# Patient Record
Sex: MALE | HISPANIC OR LATINO | URBAN - NONMETROPOLITAN AREA
[De-identification: names, ages, dates, MRNs, and addresses within clinical notes are randomized per-mention and may not be internally consistent; named-entity substitution may affect disease eponyms.]

---

## 2018-07-17 ENCOUNTER — OFFICE VISIT (OUTPATIENT)
Dept: URBAN - NONMETROPOLITAN AREA CLINIC 18 | Facility: CLINIC | Age: 53
End: 2018-07-17
Payer: COMMERCIAL

## 2018-07-17 DIAGNOSIS — H16.142 PUNCTATE KERATITIS, LEFT EYE: ICD-10-CM

## 2018-07-17 DIAGNOSIS — H43.393 OTHER VITREOUS OPACITIES, BILATERAL: ICD-10-CM

## 2018-07-17 DIAGNOSIS — H11.043 PERIPHERAL PTERYGIUM, STATIONARY, BILATERAL: ICD-10-CM

## 2018-07-17 DIAGNOSIS — H52.4 PRESBYOPIA: Primary | ICD-10-CM

## 2018-07-17 PROCEDURE — 92014 COMPRE OPH EXAM EST PT 1/>: CPT | Performed by: OPTOMETRIST

## 2018-07-17 ASSESSMENT — VISUAL ACUITY
OS: 20/25
OD: 20/20

## 2018-07-17 ASSESSMENT — INTRAOCULAR PRESSURE
OS: 18
OD: 18

## 2018-07-17 NOTE — IMPRESSION/PLAN
Impression: Peripheral pterygium, stationary, bilateral: H11.043. Plan: Patient educated to condition. Sunglasses and UV protection recommended. Patient knows to RTC if redness or irritation are experienced.

## 2018-07-17 NOTE — IMPRESSION/PLAN
Impression: Punctate keratitis, left eye: H16.142. Plan: Discussed condition with patient in detail. Recommend treatment with Artificial tears QID (Systane or Refresh Brand recommended). RTC if symptoms continue.